# Patient Record
Sex: FEMALE | Race: WHITE | HISPANIC OR LATINO | Employment: FULL TIME | URBAN - METROPOLITAN AREA
[De-identification: names, ages, dates, MRNs, and addresses within clinical notes are randomized per-mention and may not be internally consistent; named-entity substitution may affect disease eponyms.]

---

## 2017-03-10 ENCOUNTER — HOSPITAL ENCOUNTER (OUTPATIENT)
Dept: RADIOLOGY | Facility: HOSPITAL | Age: 62
Discharge: HOME | End: 2017-03-10
Payer: COMMERCIAL

## 2017-03-10 ENCOUNTER — TRANSCRIBE ORDERS (OUTPATIENT)
Dept: ADMINISTRATIVE | Facility: HOSPITAL | Age: 62
End: 2017-03-10

## 2017-03-10 DIAGNOSIS — Z00.8 OTHER SPECIFIED GENERAL MEDICAL EXAMINATION: Primary | ICD-10-CM

## 2017-03-10 DIAGNOSIS — Z00.8 OTHER SPECIFIED GENERAL MEDICAL EXAMINATION: ICD-10-CM

## 2017-03-10 PROCEDURE — 71020 HB CHEST X-RAY 2VW FRONTAL&LATL: CPT

## 2019-04-10 ENCOUNTER — TRANSCRIBE ORDERS (OUTPATIENT)
Dept: ADMINISTRATIVE | Facility: HOSPITAL | Age: 64
End: 2019-04-10

## 2019-04-10 ENCOUNTER — HOSPITAL ENCOUNTER (OUTPATIENT)
Dept: RADIOLOGY | Facility: HOSPITAL | Age: 64
Discharge: HOME | End: 2019-04-10
Payer: COMMERCIAL

## 2019-04-10 DIAGNOSIS — R06.00 DYSPNEA, UNSPECIFIED TYPE: Primary | ICD-10-CM

## 2019-04-10 DIAGNOSIS — R06.00 DYSPNEA, UNSPECIFIED TYPE: ICD-10-CM

## 2019-04-10 PROCEDURE — 71046 X-RAY EXAM CHEST 2 VIEWS: CPT

## 2021-01-06 ENCOUNTER — TRANSCRIBE ORDERS (OUTPATIENT)
Dept: ADMINISTRATIVE | Facility: HOSPITAL | Age: 66
End: 2021-01-06

## 2021-01-06 ENCOUNTER — HOSPITAL ENCOUNTER (OUTPATIENT)
Dept: RADIOLOGY | Facility: HOSPITAL | Age: 66
Discharge: HOME | End: 2021-01-06
Payer: MEDICARE

## 2021-01-06 DIAGNOSIS — Z78.0 POST-MENOPAUSAL: ICD-10-CM

## 2021-01-06 DIAGNOSIS — Z00.00 ROUTINE GENERAL MEDICAL EXAMINATION AT A HEALTH CARE FACILITY: Primary | ICD-10-CM

## 2021-01-06 DIAGNOSIS — Z12.31 ENCOUNTER FOR SCREENING MAMMOGRAM FOR MALIGNANT NEOPLASM OF BREAST: Primary | ICD-10-CM

## 2021-01-06 PROCEDURE — 71046 X-RAY EXAM CHEST 2 VIEWS: CPT

## 2021-01-13 ENCOUNTER — OFFICE VISIT (OUTPATIENT)
Dept: GASTROENTEROLOGY | Facility: CLINIC | Age: 66
End: 2021-01-13
Payer: MEDICARE

## 2021-01-13 VITALS
TEMPERATURE: 97.8 F | HEART RATE: 78 BPM | DIASTOLIC BLOOD PRESSURE: 74 MMHG | WEIGHT: 112 LBS | BODY MASS INDEX: 23.41 KG/M2 | SYSTOLIC BLOOD PRESSURE: 154 MMHG

## 2021-01-13 DIAGNOSIS — R19.7 DIARRHEA, UNSPECIFIED TYPE: ICD-10-CM

## 2021-01-13 DIAGNOSIS — K90.0 CELIAC DISEASE: Primary | ICD-10-CM

## 2021-01-13 DIAGNOSIS — K21.9 GASTROESOPHAGEAL REFLUX DISEASE WITHOUT ESOPHAGITIS: ICD-10-CM

## 2021-01-13 PROCEDURE — 99204 OFFICE O/P NEW MOD 45 MIN: CPT | Performed by: INTERNAL MEDICINE

## 2021-01-13 RX ORDER — OMEPRAZOLE 40 MG/1
40 CAPSULE, DELAYED RELEASE ORAL DAILY
COMMUNITY
Start: 2021-01-07

## 2021-01-13 RX ORDER — UBIDECARENONE 75 MG
CAPSULE ORAL
COMMUNITY

## 2021-01-13 NOTE — PROGRESS NOTES
Consultation - [address] [facility]  Sharath Monae 1955 female         Chief Complaint:  For EGD and colonoscopy    HPI:  17-year-old  female with history of pancytopenia, celiac disease was brought in by her daughter who helped with translation  Patient was diagnosed with celiac disease about 5 years ago and reports not being strict on gluten free diet  She eats bread regularly  Complaining about chronic diarrhea but has improved from before with regular bowel movements even though she reports using half the Imodium tablet once a week  Denies any blood or mucus in the stool  Good appetite and denies any recent weight loss  Denies any heartburn or acid reflux but takes omeprazole regularly  Denies any difficulty swallowing  When I reviewed her previous records I found out that she was also significantly pancytopenic with platelet count of 16 K, WBC 2 7 back in 2016 when she was seen by Dr Lashonda Peng  She reports that she has not had any more follow-ups since then  She was just seen by her PCP in [facility] after more than year and had lab workup couple of weeks ago  Patient's daughter does not remember about the pancytopenia  REVIEW OF SYSTEMS: Review of Systems   Constitutional: Negative for activity change, appetite change, chills, diaphoresis, fatigue, fever and unexpected weight change  HENT: Negative for ear discharge, ear pain, facial swelling, hearing loss, nosebleeds, sore throat, tinnitus and voice change  Eyes: Negative for pain, discharge, redness, itching and visual disturbance  Respiratory: Negative for apnea, cough, chest tightness, shortness of breath and wheezing  Cardiovascular: Negative for chest pain and palpitations  Gastrointestinal:        As noted in HPI   Endocrine: Negative for cold intolerance, heat intolerance and polyuria  Genitourinary: Negative for difficulty urinating, dysuria, flank pain, hematuria and urgency  Musculoskeletal: Negative for arthralgias, back pain, gait problem, joint swelling and myalgias  Skin: Negative for rash and wound  Neurological: Negative for dizziness, tremors, seizures, speech difficulty, light-headedness, numbness and headaches  Hematological: Negative for adenopathy  Does not bruise/bleed easily  Psychiatric/Behavioral: Negative for agitation, behavioral problems and confusion  The patient is not nervous/anxious  Past Medical History:   Diagnosis Date    Anemia     Celiac disease     Diarrhea       Past Surgical History:   Procedure Laterality Date    EGD AND COLONOSCOPY Left 1/13/2016    Procedure: EGD with the biopsy and COLONOSCOPYwith biopsies;  Surgeon: Severo House, MD;  Location: [facility];   Service:     HYSTERECTOMY      oopherectomy     Social History     Socioeconomic History    Marital status: Single     Spouse name: Not on file    Number of children: Not on file    Years of education: Not on file    Highest education level: Not on file   Occupational History    Not on file   Social Needs    Financial resource strain: Not on file    Food insecurity     Worry: Not on file     Inability: Not on file    Transportation needs     Medical: Not on file     Non-medical: Not on file   Tobacco Use    Smoking status: Never Smoker    Smokeless tobacco: Never Used   Substance and Sexual Activity    Alcohol use: No    Drug use: No    Sexual activity: Not on file   Lifestyle    Physical activity     Days per week: Not on file     Minutes per session: Not on file    Stress: Not on file   Relationships    Social connections     Talks on phone: Not on file     Gets together: Not on file     Attends Baptism service: Not on file     Active member of club or organization: Not on file     Attends meetings of clubs or organizations: Not on file     Relationship status: Not on file    Intimate partner violence     Fear of current or ex partner: Not on file Emotionally abused: Not on file     Physically abused: Not on file     Forced sexual activity: Not on file   Other Topics Concern    Not on file   Social History Narrative    Not on file     Family History   Problem Relation Age of Onset    Hypertension Mother     Hyperlipidemia Mother     Lung cancer Father      Patient has no known allergies  Current Outpatient Medications   Medication Sig Dispense Refill    cyanocobalamin (VITAMIN B-12) 100 mcg tablet Take by mouth      omeprazole (PriLOSEC) 40 MG capsule Take 40 mg by mouth daily       No current facility-administered medications for this visit  Blood pressure 154/74, pulse 78, temperature 97 8 °F (36 6 °C), weight 50 8 kg (112 lb)  PHYSICAL EXAM: Physical Exam  Constitutional:       Appearance: She is well-developed  HENT:      Head: Normocephalic and atraumatic  Nose: Nose normal    Eyes:      General: No scleral icterus  Right eye: No discharge  Left eye: No discharge  Conjunctiva/sclera: Conjunctivae normal    Neck:      Musculoskeletal: Neck supple  Thyroid: No thyromegaly  Vascular: No JVD  Trachea: No tracheal deviation  Cardiovascular:      Rate and Rhythm: Normal rate and regular rhythm  Heart sounds: Normal heart sounds  No murmur  No friction rub  No gallop  Pulmonary:      Effort: Pulmonary effort is normal  No respiratory distress  Breath sounds: Normal breath sounds  No wheezing or rales  Chest:      Chest wall: No tenderness  Abdominal:      General: Bowel sounds are normal  There is no distension  Palpations: Abdomen is soft  There is no mass  Tenderness: There is no abdominal tenderness  There is no guarding or rebound  Hernia: No hernia is present  Musculoskeletal:         General: No tenderness or deformity  Lymphadenopathy:      Cervical: No cervical adenopathy  Skin:     General: Skin is warm and dry  Findings: No erythema or rash  Neurological:      Mental Status: She is alert and oriented to person, place, and time  Psychiatric:         Behavior: Behavior normal          Thought Content: Thought content normal           Lab Results   Component Value Date    WBC 4 5 03/10/2016    HGB 11 6 03/10/2016    HCT 36 9 03/10/2016    MCV 97 03/10/2016     03/10/2016     Lab Results   Component Value Date    GLUCOSE 88 01/08/2016    CALCIUM 8 6 01/13/2016     01/08/2016    K 3 7 01/13/2016    CO2 28 01/13/2016     01/13/2016    BUN 5 01/13/2016    CREATININE 0 47 (L) 01/13/2016     Lab Results   Component Value Date    ALT 42 01/08/2016    AST 66 (H) 01/08/2016    ALKPHOS 69 01/08/2016    BILITOT 0 8 01/08/2016     Lab Results   Component Value Date    INR 1 30 01/08/2016    PROTIME 13 5 (H) 01/08/2016       Xr Chest Pa & Lateral    Result Date: 1/8/2021  Impression: No acute cardiopulmonary disease  Workstation performed: [id number]       ASSESSMENT & PLAN:    Celiac disease  It appears the patient is not strict with gluten free diet    -explained to patient and her daughter in detail about the pathophysiology of celiac disease and advised about strict gluten free diet    -schedule for upper endoscopy with duodenal biopsies but concerned about the history of severe pancytopenia for which she has not had any followups with hematologist in the past few years  She just had lab workup through her PCP  Will get those lab results 1st and decide on the timing of the EGD and colonoscopy  Gastroesophageal reflux disease without esophagitis  Gastroesophageal reflux disease - Patient has the symptoms of chronic acid reflux for a long time  Possible hiatal hernia or LES weakness  Should rule out Coles's esophagus because of chronic symptoms         -Patient was explained about the lifestyle and dietary modifications  Advised to avoid fatty foods, chocolates, caffeine, alcohol and any other triggering foods    Avoid eating for at least 3 hours before going to bed  -EGD    -will try to change omeprazole to Pepcid after the EGD    Diarrhea  History of diarrhea which appear to be due to celiac disease    Patient reports using half a tablet of Imodium every week and having regular bowel movements     -schedule for colonoscopy along with EGD once get her lab results and get clearance from Hematology

## 2021-01-13 NOTE — ASSESSMENT & PLAN NOTE
Gastroesophageal reflux disease - Patient has the symptoms of chronic acid reflux for a long time  Possible hiatal hernia or LES weakness  Should rule out Coles's esophagus because of chronic symptoms         -Patient was explained about the lifestyle and dietary modifications  Advised to avoid fatty foods, chocolates, caffeine, alcohol and any other triggering foods  Avoid eating for at least 3 hours before going to bed          -EGD    -will try to change omeprazole to Pepcid after the EGD

## 2021-01-13 NOTE — ASSESSMENT & PLAN NOTE
It appears the patient is not strict with gluten free diet    -explained to patient and her daughter in detail about the pathophysiology of celiac disease and advised about strict gluten free diet    -schedule for upper endoscopy with duodenal biopsies but concerned about the history of severe pancytopenia for which she has not had any followups with hematologist in the past few years  She just had lab workup through her PCP  Will get those lab results 1st and decide on the timing of the EGD and colonoscopy

## 2021-01-13 NOTE — ASSESSMENT & PLAN NOTE
History of diarrhea which appear to be due to celiac disease    Patient reports using half a tablet of Imodium every week and having regular bowel movements     -schedule for colonoscopy along with EGD once get her lab results and get clearance from Hematology

## 2021-01-14 ENCOUNTER — CONSULT (OUTPATIENT)
Dept: CARDIOLOGY CLINIC | Facility: CLINIC | Age: 66
End: 2021-01-14
Payer: MEDICARE

## 2021-01-14 VITALS
OXYGEN SATURATION: 99 % | HEART RATE: 80 BPM | TEMPERATURE: 98 F | SYSTOLIC BLOOD PRESSURE: 130 MMHG | HEIGHT: 60 IN | WEIGHT: 111 LBS | BODY MASS INDEX: 21.79 KG/M2 | DIASTOLIC BLOOD PRESSURE: 86 MMHG

## 2021-01-14 DIAGNOSIS — I10 HYPERTENSION, UNSPECIFIED TYPE: Primary | ICD-10-CM

## 2021-01-14 DIAGNOSIS — R94.31 ABNORMAL EKG: ICD-10-CM

## 2021-01-14 PROCEDURE — 99204 OFFICE O/P NEW MOD 45 MIN: CPT | Performed by: INTERNAL MEDICINE

## 2021-01-14 PROCEDURE — 93000 ELECTROCARDIOGRAM COMPLETE: CPT | Performed by: INTERNAL MEDICINE

## 2021-02-11 ENCOUNTER — HOSPITAL ENCOUNTER (OUTPATIENT)
Dept: RADIOLOGY | Facility: HOSPITAL | Age: 66
Discharge: HOME | End: 2021-02-11
Payer: MEDICARE

## 2021-02-11 VITALS — BODY MASS INDEX: 20.42 KG/M2 | HEIGHT: 60 IN | WEIGHT: 104 LBS

## 2021-02-11 DIAGNOSIS — Z78.0 POST-MENOPAUSAL: ICD-10-CM

## 2021-02-11 DIAGNOSIS — Z12.31 ENCOUNTER FOR SCREENING MAMMOGRAM FOR MALIGNANT NEOPLASM OF BREAST: ICD-10-CM

## 2021-02-11 PROCEDURE — 77063 BREAST TOMOSYNTHESIS BI: CPT

## 2021-02-11 PROCEDURE — 77080 DXA BONE DENSITY AXIAL: CPT

## 2021-02-11 PROCEDURE — 77067 SCR MAMMO BI INCL CAD: CPT

## 2021-02-19 ENCOUNTER — TELEPHONE (OUTPATIENT)
Dept: RADIOLOGY | Facility: HOSPITAL | Age: 66
End: 2021-02-19

## 2021-02-19 ENCOUNTER — HOSPITAL ENCOUNTER (OUTPATIENT)
Dept: NON INVASIVE DIAGNOSTICS | Facility: HOSPITAL | Age: 66
Discharge: HOME | End: 2021-02-19
Attending: INTERNAL MEDICINE

## 2021-03-03 ENCOUNTER — HOSPITAL ENCOUNTER (OUTPATIENT)
Dept: RADIOLOGY | Facility: HOSPITAL | Age: 66
Discharge: HOME | End: 2021-03-03
Payer: MEDICARE

## 2021-03-03 VITALS — WEIGHT: 104 LBS | BODY MASS INDEX: 20.42 KG/M2 | HEIGHT: 60 IN

## 2021-03-03 DIAGNOSIS — R92.8 ABNORMAL SCREENING MAMMOGRAM: ICD-10-CM

## 2021-03-03 PROCEDURE — 76642 ULTRASOUND BREAST LIMITED: CPT

## 2021-03-03 PROCEDURE — 77065 DX MAMMO INCL CAD UNI: CPT

## 2021-03-03 PROCEDURE — G0279 TOMOSYNTHESIS, MAMMO: HCPCS

## 2021-03-04 ENCOUNTER — IMMUNIZATIONS (OUTPATIENT)
Dept: FAMILY MEDICINE CLINIC | Facility: HOSPITAL | Age: 66
End: 2021-03-04

## 2021-03-04 DIAGNOSIS — Z23 ENCOUNTER FOR IMMUNIZATION: Primary | ICD-10-CM

## 2021-03-04 PROCEDURE — 0001A SARS-COV-2 / COVID-19 MRNA VACCINE (PFIZER-BIONTECH) 30 MCG: CPT

## 2021-03-04 PROCEDURE — 91300 SARS-COV-2 / COVID-19 MRNA VACCINE (PFIZER-BIONTECH) 30 MCG: CPT

## 2021-03-25 ENCOUNTER — HOSPITAL ENCOUNTER (OUTPATIENT)
Dept: NON INVASIVE DIAGNOSTICS | Facility: HOSPITAL | Age: 66
Discharge: HOME | End: 2021-03-25
Attending: INTERNAL MEDICINE

## 2021-03-28 ENCOUNTER — IMMUNIZATIONS (OUTPATIENT)
Dept: FAMILY MEDICINE CLINIC | Facility: HOSPITAL | Age: 66
End: 2021-03-28

## 2021-03-28 DIAGNOSIS — Z23 ENCOUNTER FOR IMMUNIZATION: Primary | ICD-10-CM

## 2021-03-28 PROCEDURE — 91300 SARS-COV-2 / COVID-19 MRNA VACCINE (PFIZER-BIONTECH) 30 MCG: CPT

## 2021-03-28 PROCEDURE — 0002A SARS-COV-2 / COVID-19 MRNA VACCINE (PFIZER-BIONTECH) 30 MCG: CPT

## 2021-11-03 ENCOUNTER — TELEPHONE (OUTPATIENT)
Dept: DERMATOLOGY | Facility: CLINIC | Age: 66
End: 2021-11-03

## 2021-11-03 ENCOUNTER — TELEPHONE (OUTPATIENT)
Dept: PODIATRY | Facility: CLINIC | Age: 66
End: 2021-11-03

## 2021-11-11 ENCOUNTER — OFFICE VISIT (OUTPATIENT)
Dept: PODIATRY | Facility: CLINIC | Age: 66
End: 2021-11-11
Payer: MEDICARE

## 2021-11-11 VITALS
BODY MASS INDEX: 20.42 KG/M2 | DIASTOLIC BLOOD PRESSURE: 86 MMHG | HEART RATE: 78 BPM | WEIGHT: 104 LBS | SYSTOLIC BLOOD PRESSURE: 130 MMHG | RESPIRATION RATE: 17 BRPM | HEIGHT: 60 IN

## 2021-11-11 DIAGNOSIS — L85.3 XEROSIS CUTIS: Primary | ICD-10-CM

## 2021-11-11 DIAGNOSIS — M20.41 HAMMER TOES OF BOTH FEET: ICD-10-CM

## 2021-11-11 DIAGNOSIS — M79.672 PAIN IN BOTH FEET: ICD-10-CM

## 2021-11-11 DIAGNOSIS — L81.9 HYPERPIGMENTATION OF SKIN: ICD-10-CM

## 2021-11-11 DIAGNOSIS — M20.42 HAMMER TOES OF BOTH FEET: ICD-10-CM

## 2021-11-11 DIAGNOSIS — I83.819 VARICOSE VEINS WITH PAIN: ICD-10-CM

## 2021-11-11 DIAGNOSIS — M79.671 PAIN IN BOTH FEET: ICD-10-CM

## 2021-11-11 DIAGNOSIS — L60.0 INGROWN TOENAIL: ICD-10-CM

## 2021-11-11 DIAGNOSIS — B35.1 ONYCHOMYCOSIS: ICD-10-CM

## 2021-11-11 PROCEDURE — 99203 OFFICE O/P NEW LOW 30 MIN: CPT | Performed by: PODIATRIST

## 2021-11-16 RX ORDER — AMMONIUM LACTATE 12 G/100G
LOTION TOPICAL 2 TIMES DAILY PRN
Qty: 400 G | Refills: 0 | Status: SHIPPED | OUTPATIENT
Start: 2021-11-16

## 2022-01-05 ENCOUNTER — OFFICE VISIT (OUTPATIENT)
Dept: DERMATOLOGY | Age: 67
End: 2022-01-05
Payer: MEDICARE

## 2022-01-05 VITALS — BODY MASS INDEX: 22.87 KG/M2 | TEMPERATURE: 97.3 F | WEIGHT: 106 LBS | HEIGHT: 57 IN

## 2022-01-05 DIAGNOSIS — L85.3 XEROSIS OF SKIN: Primary | ICD-10-CM

## 2022-01-05 PROCEDURE — 99203 OFFICE O/P NEW LOW 30 MIN: CPT | Performed by: DERMATOLOGY

## 2022-01-05 RX ORDER — UREA 10 %
LOTION (ML) TOPICAL 2 TIMES DAILY
Qty: 453 G | Refills: 1 | Status: SHIPPED | OUTPATIENT
Start: 2022-01-05 | End: 2022-02-04

## 2022-01-05 NOTE — PATIENT INSTRUCTIONS
XEROSIS ("DRY SKIN")    Assessment and Plan:  Based on a thorough discussion of this condition and the management approach to it (including a comprehensive discussion of the known risks, side effects and potential benefits of treatment), the patient (family) agrees to implement the following specific plan:   Urea cream twice a day to arms and once a day to face especially after bathing until improved   Limit soap to arms to 1-2 times a week    Follow up as needed     Dry skin refers to skin that feels dry to touch  Dry skin has a dull surface with a rough, scaly quality  The skin is less pliable and cracked  When dryness is severe, the skin may become inflamed and fissured  Although any body site can be dry, dry skin tends to affect the shins more than any other site  Dry skin is lacking moisture in the outer horny cell layer (stratum corneum) and this results in cracks in the skin surface  Dry skin is also called xerosis, xeroderma or asteatosis (lack of fat)  It can affect males and females of all ages  There is some racial variability in water and lipid content of the skin   Dry skin that starts in early childhood may be one of about 20 types of ichthyosis (fish-scale skin)  There is often a family history of dry skin   Dry skin is commonly seen in people with atopic dermatitis   Nearly everyone > 60 years has dry skin  Dry skin that begins later may be seen in people with certain diseases and conditions   Postmenopausal women   Hypothyroidism   Chronic renal disease    Malnutrition and weight loss    Subclinical dermatitis    Treatment with certain drugs such as oral retinoids, diuretics and epidermal growth factor receptor inhibitors    People exposed to a dry environment may experience dry skin     Low humidity: in desert climates or cool, windy conditions    Excessive air conditioning    Direct heat from a fire or fan heater    Excessive bathing    Contact with soap, detergents and solvents    Inappropriate topical agents such as alcohol    Frictional irritation from rough clothing or abrasives    Dry skin is due to abnormalities in the integrity of the barrier function of the stratum corneum, which is made up of corneocytes   There is an overall reduction in the lipids in the stratum corneum   Ratio of ceramides, cholesterol and free fatty acids may be normal or altered   There may be a reduction in the proliferation of keratinocytes   Keratinocyte subtypes change in dry skin with a decrease in keratins K1, K10 and increase in K5, K14     Involucrin (a protein) may be expressed early, increasing cell stiffness   The result is retention of corneocytes and reduced water-holding capacity  The inherited forms of ichthyosis are due to loss of function mutations in various genes (listed in parentheses below)  The clinical features of ichthyosis depend on the specific type of ichthyosis:   Ichthyosis vulgaris (FLG)   Recessive X-linked ichthyosis (STS)    Autosomal recessive congenital ichthyosis (ABCA12, TGM1, ALOXE3)    Keratinopathic ichthyoses (KRT1, KRT10, KRT2)    Acquired ichthyosis may be due to:   Metabolic factors: thyroid deficiency    Illness: lymphoma, internal malignancy, sarcoidosis, HIV infection    Drugs: nicotinic acid, kava, protein kinase inhibitors (eg EGFR inhibitors), hydroxyurea  Complications of dry skin:  Dry areas of skin may become itchy, indicating a form of eczema/dermatitis has developed   Atopic eczema -- especially in people with ichthyosis vulgaris    Eczema craquelé -- especially in elderly people  Also called asteatotic eczema    A dry form of nummular dermatitis/discoid eczema -- especially in people that wash their skin excessively  When the dry skin of an elderly person is itchy without a visible rash, it is sometimes called winter itch, 7th age itch, senile pruritus or chronic pruritus of the elderly      Other complications of dry skin may include:   Skin infection when bacteria or viruses penetrate a break in the skin surface    Overheating, especially in some forms of ichthyosis    Food allergy, eg, to peanuts, has been associated with filaggrin mutations    Contact allergy, eg, to nickel, has also been correlated with barrier function defects  How is the type of dry skin diagnosed? The type of dry skin is diagnosed by careful history and examination  In children:   Family history    Age of onset    Appearance at birth, if known    Distribution of dry skin    Other features, eg eczema, abnormal nails, hair, dentition, sight, hearing  In adults:   Medical history    Medications and topical preparations    Bathing frequency and use of soap    Evaluation of environmental factors that may contribute to dry skin  What is the treatment for dry skin? The mainstay of treatment of dry skin and ichthyosis is moisturisers/emollients  They should be applied liberally and often enough to:   Reduce itch    Improve the barrier function    Prevent entry of irritants, bacteria    Reduce transepidermal water loss  When considering which emollient is most suitable, consider:   Severity of the dryness    Tolerance    Personal preference    Cost and availability  Emollients generally work best if applied to damp skin, if pH is below 7 (acidic), and if containing humectants such as urea or propylene glycol  Additional treatments include:   Topical steroid if itchy or there is dermatitis -- choose an emollient base    Topical calcineurin inhibitors if topical steroids are unsuitable  How can dry skin be prevented? Eliminate aggravating factors:   Reduce the frequency of bathing   A humidifier in winter and air conditioner in summer    Compare having a short shower with a prolonged soak in a bath   Use lukewarm, not hot, water      Replace standard soap with a substitute such as a synthetic detergent cleanser, water-miscible emollient, bath oil, anti-pruritic tar oil, colloidal oatmeal etc     Apply an emollient liberally and often, particularly shortly after bathing, and when itchy  The drier the skin, the thicker this should be, especially on the hands  What is the outlook for dry skin? A tendency to dry skin may persist life-long, or it may improve once contributing factors are controlled

## 2022-01-05 NOTE — PROGRESS NOTES
[facility] Note     Patient Name: Maldonado Almonte  Encounter Date: 01/05/2021     Have you been cared for by a [facility] in the last 3 years and, if so, which one? No    · Have you traveled outside of the [address] [place] [address] in the past 3 months or outside of the [facility] area in the last 2 weeks? No     May we call your Preferred Phone number to discuss your specific medical information? Yes     May we leave a detailed message that includes your specific medical information? Yes      Today's Chief Concerns:   Concern #1:  Dry skin and pigmentation       Past Medical History:  Have you personally ever had or currently have any of the following? · Skin cancer (such as Melanoma, Basal Cell Carcinoma, Squamous Cell Carcinoma? (If Yes, please provide more detail)- No  · Eczema: No  · Psoriasis: No  · HIV/AIDS: No  · Hepatitis B or C: No  · Tuberculosis: No  · Systemic Immunosuppression such as Diabetes, Biologic or Immunotherapy, Chemotherapy, Organ Transplantation, Bone Marrow Transplantation (If YES, please provide more detail): No  · Radiation Treatment (If YES, please provide more detail): No  · Any other major medical conditions/concerns? (If Yes, which types)- No    Social History:         Family History:  Have any of your "first degree relatives" (parent, brother, sister, or child) had any of the following       · Skin cancer such as Melanoma or Merkel Cell Carcinoma or Pancreatic Cancer? No  · Eczema, Asthma, Hay Fever or Seasonal Allergies: No  · Psoriasis or Psoriatic Arthritis: No  · Do any other medical conditions seem to run in your family? If Yes, what condition and which relatives?   No    Current Medications:       Current Outpatient Medications:     ammonium lactate (LAC-HYDRIN) 12 % lotion, Apply topically 2 (two) times a day as needed for dry skin, Disp: 400 g, Rfl: 0    cyanocobalamin (VITAMIN B-12) 100 mcg tablet, Take by mouth, Disp: , Rfl:     omeprazole (PriLOSEC) 40 MG capsule, Take 40 mg by mouth daily, Disp: , Rfl:       Review of Systems:  Have you recently had or currently have any of the following? If YES, what are you doing for the problem? · Fever, chills or unintended weight loss: No  · Sudden loss or change in your vision: No  · Nausea, vomiting or blood in your stool: No  · Painful or swollen joints: No  · Wheezing or cough: No  · Changing mole or non-healing wound: No  · Nosebleeds: No  · Excessive sweating: No  · Easy or prolonged bleeding? No  · Over the last 2 weeks, how often have you been bothered by the following problems? · Taking little interest or pleasure in doing things: 1 - Not at All  · Feeling down, depressed, or hopeless: 1 - Not at All  · Rapid heartbeat with epinephrine:  No    · FEMALES ONLY:    · Are you pregnant or planning to become pregnant? No  · Are you currently or planning to be nursing or breast feeding? No    · Any known allergies? · No Known Allergies      Physical Exam:     Was a chaperone (Derm Clinical Assistant) present throughout the entire Physical Exam? Yes     Did the Dermatology Team specifically  the patient on the importance of a Full Skin Exam to be sure that nothing is missed clinically? Yes}  o Did the patient ultimately request or accept a Full Skin Exam?  NO  o Did the patient specifically refuse to have the areas "under-the-bra" examined by the Dermatologist? No  o Did the patient specifically refuse to have the areas "under-the-underwear" examined by the Dermatologist? No    CONSTITUTIONAL:   There were no vitals filed for this visit      PSYCH: Normal mood and affect  EYES: Normal conjunctiva  ENT: Normal lips and oral mucosa  CARDIOVASCULAR: No edema  RESPIRATORY: Normal respirations  HEME/LYMPH/IMMUNO:  No regional lymphadenopathy except as noted below in "ASSESSMENT AND PLAN BY DIAGNOSIS"    SKIN:  FULL ORGAN SYSTEM EXAM  Hair, Scalp, Ears, Face Normal except as noted below in Assessment   Neck,  Normal except as noted below in Assessment   Right Arm/Hand/Fingers Normal except as noted below in Assessment   Left Arm/Hand/Fingers Normal except as noted below in Assessment                                Assessment and Plan by Diagnosis:    History of Present Condition:     Duration:  How long has this been an issue for you?    o  more than 6 months    Location Affected:  Where on the body is this affecting you?    o  left cheek, and left arm    Quality:  Is there any bleeding, pain, itch, burning/irritation, or redness associated with the skin lesion?    o  Dry and hyperpigmented    Severity:  Describe any bleeding, pain, itch, burning/irritation, or redness on a scale of 1 to 10 (with 10 being the worst)  o  2   Timing:  Does this condition seem to be there pretty constantly or do you notice it more at specific times throughout the day? o  constant    Context:  Have you ever noticed that this condition seems to be associated with specific activities you do?    o  denies    Modifying Factors:    o Anything that seems to make the condition worse?    -  denies   o What have you tried to do to make the condition better? -  Currently using lac hydrin moisturizer    Associated Signs and Symptoms:  Does this skin lesion seem to be associated with any of the following:  o  SL AMB DERM SIGNS AND SYMPTOMS: Itching and Scratching   XEROSIS ("DRY SKIN")    Physical Exam:   Anatomic Location Affected:  Bilateral arms    Morphological Description:  Dry scaling    Pertinent Positives:   Pertinent Negatives:     Additional History of Present Condition:  Patient is here with daughter who states     Assessment and Plan:  Based on a thorough discussion of this condition and the management approach to it (including a comprehensive discussion of the known risks, side effects and potential benefits of treatment), the patient (family) agrees to implement the following specific plan:   Urea cream twice a day to arms and once a day to face especially after bathing until improved   Limit soap to arms to 1-2 times a week    Follow up as needed     Dry skin refers to skin that feels dry to touch  Dry skin has a dull surface with a rough, scaly quality  The skin is less pliable and cracked  When dryness is severe, the skin may become inflamed and fissured  Although any body site can be dry, dry skin tends to affect the shins more than any other site  Dry skin is lacking moisture in the outer horny cell layer (stratum corneum) and this results in cracks in the skin surface  Dry skin is also called xerosis, xeroderma or asteatosis (lack of fat)  It can affect males and females of all ages  There is some racial variability in water and lipid content of the skin   Dry skin that starts in early childhood may be one of about 20 types of ichthyosis (fish-scale skin)  There is often a family history of dry skin   Dry skin is commonly seen in people with atopic dermatitis   Nearly everyone > 60 years has dry skin  Dry skin that begins later may be seen in people with certain diseases and conditions   Postmenopausal women   Hypothyroidism   Chronic renal disease    Malnutrition and weight loss    Subclinical dermatitis    Treatment with certain drugs such as oral retinoids, diuretics and epidermal growth factor receptor inhibitors    People exposed to a dry environment may experience dry skin   Low humidity: in desert climates or cool, windy conditions    Excessive air conditioning    Direct heat from a fire or fan heater    Excessive bathing    Contact with soap, detergents and solvents    Inappropriate topical agents such as alcohol    Frictional irritation from rough clothing or abrasives    Dry skin is due to abnormalities in the integrity of the barrier function of the stratum corneum, which is made up of corneocytes     There is an overall reduction in the lipids in the stratum corneum   Ratio of ceramides, cholesterol and free fatty acids may be normal or altered   There may be a reduction in the proliferation of keratinocytes   Keratinocyte subtypes change in dry skin with a decrease in keratins K1, K10 and increase in K5, K14     Involucrin (a protein) may be expressed early, increasing cell stiffness   The result is retention of corneocytes and reduced water-holding capacity  The inherited forms of ichthyosis are due to loss of function mutations in various genes (listed in parentheses below)  The clinical features of ichthyosis depend on the specific type of ichthyosis:   Ichthyosis vulgaris (FLG)   Recessive X-linked ichthyosis (STS)    Autosomal recessive congenital ichthyosis (ABCA12, TGM1, ALOXE3)    Keratinopathic ichthyoses (KRT1, KRT10, KRT2)    Acquired ichthyosis may be due to:   Metabolic factors: thyroid deficiency    Illness: lymphoma, internal malignancy, sarcoidosis, HIV infection    Drugs: nicotinic acid, kava, protein kinase inhibitors (eg EGFR inhibitors), hydroxyurea  Complications of dry skin:  Dry areas of skin may become itchy, indicating a form of eczema/dermatitis has developed   Atopic eczema -- especially in people with ichthyosis vulgaris    Eczema craquelé -- especially in elderly people  Also called asteatotic eczema    A dry form of nummular dermatitis/discoid eczema -- especially in people that wash their skin excessively  When the dry skin of an elderly person is itchy without a visible rash, it is sometimes called winter itch, 7th age itch, senile pruritus or chronic pruritus of the elderly      Other complications of dry skin may include:   Skin infection when bacteria or viruses penetrate a break in the skin surface    Overheating, especially in some forms of ichthyosis    Food allergy, eg, to peanuts, has been associated with filaggrin mutations    Contact allergy, eg, to nickel, has also been correlated with barrier function defects  How is the type of dry skin diagnosed? The type of dry skin is diagnosed by careful history and examination  In children:   Family history    Age of onset    Appearance at birth, if known    Distribution of dry skin    Other features, eg eczema, abnormal nails, hair, dentition, sight, hearing  In adults:   Medical history    Medications and topical preparations    Bathing frequency and use of soap    Evaluation of environmental factors that may contribute to dry skin  What is the treatment for dry skin? The mainstay of treatment of dry skin and ichthyosis is moisturisers/emollients  They should be applied liberally and often enough to:   Reduce itch    Improve the barrier function    Prevent entry of irritants, bacteria    Reduce transepidermal water loss  When considering which emollient is most suitable, consider:   Severity of the dryness    Tolerance    Personal preference    Cost and availability  Emollients generally work best if applied to damp skin, if pH is below 7 (acidic), and if containing humectants such as urea or propylene glycol  Additional treatments include:   Topical steroid if itchy or there is dermatitis -- choose an emollient base    Topical calcineurin inhibitors if topical steroids are unsuitable  How can dry skin be prevented? Eliminate aggravating factors:   Reduce the frequency of bathing   A humidifier in winter and air conditioner in summer    Compare having a short shower with a prolonged soak in a bath   Use lukewarm, not hot, water   Replace standard soap with a substitute such as a synthetic detergent cleanser, water-miscible emollient, bath oil, anti-pruritic tar oil, colloidal oatmeal etc     Apply an emollient liberally and often, particularly shortly after bathing, and when itchy  The drier the skin, the thicker this should be, especially on the hands      What is the outlook for dry skin? A tendency to dry skin may persist life-long, or it may improve once contributing factors are controlled        Scribe Attestation    I,:  Karlee Pinto am acting as a scribe while in the presence of the attending physician :       I,:  Yanna Fernandes MD personally performed the services described in this documentation    as scribed in my presence :

## 2022-01-14 ENCOUNTER — TELEPHONE (OUTPATIENT)
Dept: DERMATOLOGY | Facility: CLINIC | Age: 67
End: 2022-01-14

## 2022-02-01 ENCOUNTER — TELEPHONE (OUTPATIENT)
Dept: DERMATOLOGY | Facility: CLINIC | Age: 67
End: 2022-02-01

## 2022-02-01 DIAGNOSIS — L85.3 XEROSIS OF SKIN: Primary | ICD-10-CM

## 2022-02-01 NOTE — TELEPHONE ENCOUNTER
Urea 10% cream DENIED by Medicare part B  Patient can obtain this cream over the counter  I called the patient, no answer, left vm in Belgian with details   Reference ID#: [id number]

## 2022-02-01 NOTE — TELEPHONE ENCOUNTER
Dr Mali Varma: I called [facility] and spoke with Marika Carter who stated Urea 10% cream is DENIED under diagnosis code Xerosis of skin ICD-10:  L85 3  It was denied by Medicare Part B because this medication can be obtained over the counter  Reference ID: [id number]  I called the patient to inform her about this, but no answer, left a detail message in [address] [facility] (She may obtain the Urea 10% cream any over the counter pharmacy or [facility])  Advised her to call me at [phone] ( work number) or office at [phone]

## 2022-02-17 ENCOUNTER — OFFICE VISIT (OUTPATIENT)
Dept: PODIATRY | Facility: CLINIC | Age: 67
End: 2022-02-17
Payer: MEDICARE

## 2022-02-17 VITALS — RESPIRATION RATE: 17 BRPM | HEART RATE: 78 BPM | BODY MASS INDEX: 22.87 KG/M2 | HEIGHT: 57 IN | WEIGHT: 106 LBS

## 2022-02-17 DIAGNOSIS — L81.9 HYPERPIGMENTATION OF SKIN: ICD-10-CM

## 2022-02-17 DIAGNOSIS — L60.0 INGROWN TOENAIL: ICD-10-CM

## 2022-02-17 DIAGNOSIS — M20.41 HAMMER TOES OF BOTH FEET: ICD-10-CM

## 2022-02-17 DIAGNOSIS — M20.42 HAMMER TOES OF BOTH FEET: ICD-10-CM

## 2022-02-17 DIAGNOSIS — I83.819 VARICOSE VEINS WITH PAIN: ICD-10-CM

## 2022-02-17 DIAGNOSIS — L84 HELOMA MOLLE: ICD-10-CM

## 2022-02-17 DIAGNOSIS — M79.671 PAIN IN BOTH FEET: Primary | ICD-10-CM

## 2022-02-17 DIAGNOSIS — B35.1 ONYCHOMYCOSIS: ICD-10-CM

## 2022-02-17 DIAGNOSIS — M79.672 PAIN IN BOTH FEET: Primary | ICD-10-CM

## 2022-02-17 PROCEDURE — 99213 OFFICE O/P EST LOW 20 MIN: CPT | Performed by: PODIATRIST

## 2022-04-01 NOTE — PROGRESS NOTES
Assessment/Plan:    Patient opted out of oral  anti fungal medications at this time, all onychomycotic dystrophic nails debrided using sterile Nipper and electrical baldev to patient tolerance, Betadine applied, patient educated on daily foot hygiene for the management of fungal infection  One bottle of CNU dispensed to the patient, patient educated on its use  Bilateral ingrown toenail, aseptic resection of the offending nail border in a slant back fashion using sterile Nipper distal to the eponychium, the use of wide toe box shoes, patient to report to the office if condition recur  Patient advised and the following with vascular surgeon regarding the vein patient is using compression stockings as discussed with the patient bunion and hammertoe deformity management including conservative orthotics and shoe modification of oral or injection anti-inflammatory medication versus surgical management  Patient use ammonium lactate for xerosis and hyper pigmentation  Diagnoses and all orders for this visit:    Pain in both feet    Ingrown toenail    Onychomycosis    Hammer toes of both feet    Hyperpigmentation of skin    Varicose veins with pain          Subjective:      Patient ID: Nithya Wiggins is a 77 y o  female  55-year-old female with no pertinent Podiatry past medical history of the management of thickened elongated dystrophic discolored painful toenails patient states that the condition has been present for years, patient also report change in color her skin, patient denies constitutional symptoms, patient denies trauma to the      The following portions of the patient's history were reviewed and updated as appropriate: allergies, current medications, past family history, past medical history, past social history, past surgical history and problem list     Review of Systems   Constitutional: Negative  Respiratory: Negative  Cardiovascular: Negative  Musculoskeletal: Negative      Skin: Negative  Historical Information   Past Medical History:   Diagnosis Date    Anemia     Celiac disease     Diarrhea      Past Surgical History:   Procedure Laterality Date    EGD AND COLONOSCOPY Left 1/13/2016    Procedure: EGD with the biopsy and COLONOSCOPYwith biopsies;  Surgeon: Tavares Bhandari MD;  Location: [place] [address] [facility]; Service:     HYSTERECTOMY      oopherectomy     Social History   Social History     Substance and Sexual Activity   Alcohol Use No     Social History     Substance and Sexual Activity   Drug Use No     Social History     Tobacco Use   Smoking Status Never Smoker   Smokeless Tobacco Never Used     Family History:   Family History   Problem Relation Age of Onset    Hypertension Mother     Hyperlipidemia Mother     Lung cancer Father     Breast cancer Sister 47    Breast cancer Maternal Aunt     Stomach cancer Sister     No Known Problems Sister     No Known Problems Sister     No Known Problems Sister        Meds/Allergies   all medications and allergies reviewed  No Known Allergies    Objective:      Pulse 78   Resp 17   Ht 4' 9" (1 448 m)   Wt 48 1 kg (106 lb)   BMI 22 94 kg/m²          Physical Exam  Constitutional:       General: She is not in acute distress  Appearance: She is well-developed  She is not ill-appearing, toxic-appearing or diaphoretic  HENT:      Head: Normocephalic and atraumatic  Cardiovascular:      Pulses: Normal pulses  Dorsalis pedis pulses are 2+ on the right side and 2+ on the left side  Posterior tibial pulses are 2+ on the right side and 2+ on the left side  Comments: Palpable pedal pulse, CFT is less than 3 seconds, temperature gradient within normal limits, pedal hair present, no trophic skin changes  Varicosity noted bilateral foot and ankle  Painful on palpation  Pulmonary:      Effort: No respiratory distress  Musculoskeletal:         General: Normal range of motion        Comments: No pain on palpation on range of motion of ankle joint subtalar joint metatarsal joint tarsal joints bilateral foot    HAV deformity bilateral with hypermobile 1st ray, flexible hammertoe deformity bilateral foot reducible deformity increased upon loading the forefoot   Feet:      Right foot:      Protective Sensation: 10 sites tested  10 sites sensed  Skin integrity: No ulcer, blister, skin breakdown or erythema  Left foot:      Protective Sensation: 10 sites tested  10 sites sensed  Skin integrity: No ulcer, blister, skin breakdown or erythema  Skin:     Capillary Refill: Capillary refill takes 2 to 3 seconds  Coloration: Skin is not pale  Comments: Onychomycotic the midfoot, thickened dystrophic discolored with subungual debris malodor noted painful on firm palpation, onychocryptosis noted to bilateral hallucal lateral nail border,    Ashy skin eruption bilateral foot with areas pigmentation and hyper pigmentation skin    Heloma noted 4th interspace on the right, painful on palpation   Neurological:      Mental Status: She is alert and oriented to person, place, and time  Comments:  muscle power 5/5, protective sensations intact, no focal motor deficit        Foot Exam    Right Foot/Ankle     Inspection and Palpation  Skin Exam: no blister, no maceration, no ulcer and no erythema     Neurovascular  Dorsalis pedis: 2+  Posterior tibial: 2+      Left Foot/Ankle      Inspection and Palpation  Skin Exam: no blister, no maceration, no ulcer and no erythema     Neurovascular  Dorsalis pedis: 2+  Posterior tibial: 2+

## 2023-07-13 ENCOUNTER — OFFICE VISIT (OUTPATIENT)
Dept: GASTROENTEROLOGY | Facility: CLINIC | Age: 68
End: 2023-07-13
Payer: MEDICARE

## 2023-07-13 VITALS
HEART RATE: 90 BPM | HEIGHT: 59 IN | OXYGEN SATURATION: 98 % | WEIGHT: 107 LBS | SYSTOLIC BLOOD PRESSURE: 136 MMHG | DIASTOLIC BLOOD PRESSURE: 92 MMHG | BODY MASS INDEX: 21.57 KG/M2

## 2023-07-13 DIAGNOSIS — K90.0 CELIAC DISEASE: ICD-10-CM

## 2023-07-13 DIAGNOSIS — K31.A0 INTESTINAL METAPLASIA OF STOMACH: Primary | ICD-10-CM

## 2023-07-13 DIAGNOSIS — Z80.0 FAMILY HISTORY OF COLON CANCER: ICD-10-CM

## 2023-07-13 DIAGNOSIS — D69.6 THROMBOCYTOPENIA (HCC): ICD-10-CM

## 2023-07-13 PROCEDURE — 99204 OFFICE O/P NEW MOD 45 MIN: CPT | Performed by: PHYSICIAN ASSISTANT

## 2023-07-13 RX ORDER — BIMATOPROST 0.1 MG/ML
SOLUTION/ DROPS OPHTHALMIC
COMMUNITY
Start: 2023-06-13

## 2023-07-13 RX ORDER — LOSARTAN POTASSIUM AND HYDROCHLOROTHIAZIDE 12.5; 1 MG/1; MG/1
1 TABLET ORAL DAILY
COMMUNITY
Start: 2023-04-21

## 2023-07-13 RX ORDER — OMEPRAZOLE 40 MG/1
40 CAPSULE, DELAYED RELEASE ORAL
Qty: 30 CAPSULE | Refills: 6 | Status: SHIPPED | OUTPATIENT
Start: 2023-07-13

## 2023-07-13 NOTE — PATIENT INSTRUCTIONS
Scheduled date of EGD/colonoscopy (as of today):08/01/23  Physician performing EGD/colonoscopy:Vega  Location of EGD/colonoscopy:[facility]  Desired bowel prep reviewed with patient:Akiko  Instructions reviewed with patient by:Eveline DUGGAN  Clearances:   None

## 2023-07-13 NOTE — PROGRESS NOTES
[facility] - Outpatient Follow-up Note  aJke Rossi 79 y.o. female MRN: [id number]  Encounter: [id number]          ASSESSMENT AND PLAN:      1. Celiac disease  Patient with diagnosis of celiac disease in 2016 after admission for significant pancytopenia. No follow-up since 2021. She reports following a gluten-free diet currently. Reports bowel movements are mainly regular.    -We will pursue EGD for celiac as well as gastric intestinal metaplasia as noted below.  -Recommend obtaining basic lab work including CBC, CMP as well celiac panel. We will check iron panel and vitamin B12 as well. - Consider referral to hematology if any significant abnormalities.  -Discussed the importance of continuing gluten-free diet. Consider referral to nutrition in the future if celiac appears uncontrolled as she reports adherence to diet. 2. Gastric intestinal metaplasia  Noted on EGD in 2016. Last office visit in 2021 however did not follow-up with EGD at that time. Reports sister with gastric cancer recently diagnosed.    -Schedule EGD.  -Discussed the risk of seizure including bleeding, infection and perforation.    - Restart omeprazole 40 mg daily. Discussed the importance of continuing this in the setting of metaplasia. - Continue to avoid smoking, alcohol and NSAIDs. 3.  Epigastric pain  Reports new symptoms after taking new blood pressure pill over the last few months. If she eats before taking this she has no symptoms. Abdominal exam is benign.    -Continue to eat food with medications. - They will be following up with cardiology in a few weeks. - If symptoms are persistent and EGD is unremarkable, recommend abdominal imaging    4. Family history of colon cancer, sister  Reports her sister was recently diagnosed with colon cancer at the age of 76. She had colonoscopy in 2016 which was normal.  Unclear of any history of colon polyps.   Bowel movements relatively regular at this time. No recent lab work.    -Schedule colonoscopy at same time as EGD  -Discussed the risks of procedure including bleeding, infection and perforation.  - GoLytely prep ordered    -Patient will be obtaining basic lab work including CBC and CMP prior to procedures. 5.  Pancytopenia  As well as nation for this in 2016. Followed up with hematology thought to be secondary to infectious versus celiac disease. She had repeat lab work and all cell counts have since resolved however no recent lab work available to me since 2016.    -Repeat CBC and CMP now to ensure continue to be resolved. - Would recommend referral to hematology if any persistent abnormalities. Follow up after procedure  ______________________________________________________________________    SUBJECTIVE:      Britta Aaron is a 49-year-old female with history of celiac disease, GERD who presents the office for follow-up. Presents with her daughter who helps with translation. Patient was last in the office in 2021. At that time she was recommended for EGD and colonoscopy as well as lab work however did not have this performed. Patient is doing relatively well. However she reports being nervous because her sister was recently diagnosed with gastric cancer at the age of 79 as well as her other sister being diagnosed with colon cancer at the age of 76 prompting her to come in today. She reports relatively doing well. She is currently following a gluten-free diet. She does report if she does eat gluten she has diarrhea. She also reports some epigastric pain however this is only when taking her blood pressure pill which is new over the past few months. If she eats before taking this she has been doing well. She reports recently stopping her omeprazole. Denies any smoking, alcohol or NSAID. Last lab work available in the chart to me was from 2016 showing normal white count, hemoglobin and platelets.   However does appear she had significant pancytopenia in the past with platelets as low as 16 around that time. She had EGD and colonoscopy 1/2016. She was found to have atrophic gastritis, hemorrhoids, normal duodenum. Biopsies at that time were then notable for celiac disease. Gastric biopsies negative for H. pylori however was found to have intestinal metaplasia. REVIEW OF SYSTEMS IS OTHERWISE NEGATIVE. Historical Information   Past Medical History:   Diagnosis Date   • Anemia    • Celiac disease    • Diabetes mellitus ([address])     pre diabetes   • Diarrhea    • Hypertension      Past Surgical History:   Procedure Laterality Date   • EGD AND COLONOSCOPY Left 1/13/2016    Procedure: EGD with the biopsy and COLONOSCOPYwith biopsies;  Surgeon: Michael Cade MD;  Location: [address] [place] [address] [facility]; Service:    • HYSTERECTOMY      oopherectomy     Social History   Social History     Substance and Sexual Activity   Alcohol Use No     Social History     Substance and Sexual Activity   Drug Use No     Social History     Tobacco Use   Smoking Status Never   Smokeless Tobacco Never     Family History   Problem Relation Age of Onset   • Hypertension Mother    • Hyperlipidemia Mother    • Lung cancer Father    • Breast cancer Sister 47   • Colon cancer Sister    • Stomach cancer Sister    • No Known Problems Sister    • No Known Problems Sister    • No Known Problems Sister    • Breast cancer Maternal Aunt        Meds/Allergies       Current Outpatient Medications:   •  cyanocobalamin (VITAMIN B-12) 100 mcg tablet  •  losartan-hydrochlorothiazide (HYZAAR) 100-12.5 MG per tablet  •  Lumigan 0.01 % ophthalmic drops  •  omeprazole (PriLOSEC) 40 MG capsule  •  polyethylene glycol (GOLYTELY) 4000 mL solution  •  ammonium lactate (LAC-HYDRIN) 12 % lotion  •  urea (Urea 10 Hydrating) 10 % cream    No Known Allergies        Objective     Blood pressure 136/92, pulse 90, height 4' 11" (1.499 m), weight 48.5 kg (107 lb), SpO2 98 %.  Body mass index is 21.61 kg/m².      PHYSICAL EXAM:      General Appearance:   Alert, cooperative, no distress   HEENT:   Normocephalic, atraumatic, anicteric.     Neck:  Supple, symmetrical, trachea midline   Lungs:   Clear to auscultation bilaterally; no rales, rhonchi or wheezing; respirations unlabored    Heart[de-identified]   Regular rate and rhythm; no murmur, rub, or gallop. Abdomen:   Soft, non-tender, non-distended; normal bowel sounds; no masses, no organomegaly    Genitalia:   Deferred    Rectal:   Deferred    Extremities:  No cyanosis, clubbing or edema    Pulses:  2+ and symmetric    Skin:  No jaundice, rashes, or lesions    Lymph nodes:  No palpable cervical lymphadenopathy        Lab Results:   No visits with results within 1 Day(s) from this visit. Latest known visit with results is:   Office Visit on 02/10/2016   Component Date Value   • WBC 03/10/2016 4.5    • RBC 03/10/2016 3.82    • Hemoglobin 03/10/2016 11.6    • Hematocrit 03/10/2016 36.9    • MCV 03/10/2016 97    • MCH 03/10/2016 30.4    • MCHC 03/10/2016 31.4 (L)    • RDW 03/10/2016 14.2    • Platelets 71/84/6429 324    • Neutrophils Absolute 03/10/2016 54    • Lymphocytes Absolute 03/10/2016 38    • MONOCYTES 03/10/2016 7    • Eosinophils Absolute 03/10/2016 1    • Basophils Absolute 03/10/2016 0    • Neutrophils Absolute 03/10/2016 2.4    • Lymphocytes Absolute 03/10/2016 1.7    • Monocytes Absolute 03/10/2016 0.3    • Eosinophils Absolute 03/10/2016 0.1    • Basophils Absolute 03/10/2016 0.0    • IMM. GRANULOCYTES (CD4/8) 03/10/2016 0    • IMM. GRANULOCYTES (CD4/8) 03/10/2016 0.0          Radiology Results:   No results found.

## 2023-07-13 NOTE — H&P (VIEW-ONLY)
[facility] Special[facility] - Outpatient Follow-up Note  Raghavendra Browning 79 y.o. female MRN: [id number]  Encounter: [id number]          ASSESSMENT AND PLAN:      1. Celiac disease  Patient with diagnosis of celiac disease in 2016 after admission for significant pancytopenia. No follow-up since 2021. She reports following a gluten-free diet currently. Reports bowel movements are mainly regular.    -We will pursue EGD for celiac as well as gastric intestinal metaplasia as noted below.  -Recommend obtaining basic lab work including CBC, CMP as well celiac panel. We will check iron panel and vitamin B12 as well. - Consider referral to hematology if any significant abnormalities.  -Discussed the importance of continuing gluten-free diet. Consider referral to nutrition in the future if celiac appears uncontrolled as she reports adherence to diet. 2. Gastric intestinal metaplasia  Noted on EGD in 2016. Last office visit in 2021 however did not follow-up with EGD at that time. Reports sister with gastric cancer recently diagnosed.    -Schedule EGD.  -Discussed the risk of seizure including bleeding, infection and perforation.    - Restart omeprazole 40 mg daily. Discussed the importance of continuing this in the setting of metaplasia. - Continue to avoid smoking, alcohol and NSAIDs. 3.  Epigastric pain  Reports new symptoms after taking new blood pressure pill over the last few months. If she eats before taking this she has no symptoms. Abdominal exam is benign.    -Continue to eat food with medications. - They will be following up with cardiology in a few weeks. - If symptoms are persistent and EGD is unremarkable, recommend abdominal imaging    4. Family history of colon cancer, sister  Reports her sister was recently diagnosed with colon cancer at the age of 76. She had colonoscopy in 2016 which was normal.  Unclear of any history of colon polyps.   Bowel movements relatively regular at this time. No recent lab work.    -Schedule colonoscopy at same time as EGD  -Discussed the risks of procedure including bleeding, infection and perforation.  - GoLytely prep ordered    -Patient will be obtaining basic lab work including CBC and CMP prior to procedures. 5.  Pancytopenia  As well as nation for this in 2016. Followed up with hematology thought to be secondary to infectious versus celiac disease. She had repeat lab work and all cell counts have since resolved however no recent lab work available to me since 2016.    -Repeat CBC and CMP now to ensure continue to be resolved. - Would recommend referral to hematology if any persistent abnormalities. Follow up after procedure  ______________________________________________________________________    SUBJECTIVE:      Britta Aaron is a 80-year-old female with history of celiac disease, GERD who presents the office for follow-up. Presents with her daughter who helps with translation. Patient was last in the office in 2021. At that time she was recommended for EGD and colonoscopy as well as lab work however did not have this performed. Patient is doing relatively well. However she reports being nervous because her sister was recently diagnosed with gastric cancer at the age of 79 as well as her other sister being diagnosed with colon cancer at the age of 76 prompting her to come in today. She reports relatively doing well. She is currently following a gluten-free diet. She does report if she does eat gluten she has diarrhea. She also reports some epigastric pain however this is only when taking her blood pressure pill which is new over the past few months. If she eats before taking this she has been doing well. She reports recently stopping her omeprazole. Denies any smoking, alcohol or NSAID. Last lab work available in the chart to me was from 2016 showing normal white count, hemoglobin and platelets.   However does appear she had significant pancytopenia in the past with platelets as low as 16 around that time. She had EGD and colonoscopy 1/2016. She was found to have atrophic gastritis, hemorrhoids, normal duodenum. Biopsies at that time were then notable for celiac disease. Gastric biopsies negative for H. pylori however was found to have intestinal metaplasia. REVIEW OF SYSTEMS IS OTHERWISE NEGATIVE. Historical Information   Past Medical History:   Diagnosis Date   • Anemia    • Celiac disease    • Diabetes mellitus ([address])     pre diabetes   • Diarrhea    • Hypertension      Past Surgical History:   Procedure Laterality Date   • EGD AND COLONOSCOPY Left 1/13/2016    Procedure: EGD with the biopsy and COLONOSCOPYwith biopsies;  Surgeon: Marilee Benjamin MD;  Location: [address] [place] [address] GI LAB; Service:    • HYSTERECTOMY      oopherectomy     Social History   Social History     Substance and Sexual Activity   Alcohol Use No     Social History     Substance and Sexual Activity   Drug Use No     Social History     Tobacco Use   Smoking Status Never   Smokeless Tobacco Never     Family History   Problem Relation Age of Onset   • Hypertension Mother    • Hyperlipidemia Mother    • Lung cancer Father    • Breast cancer Sister 47   • Colon cancer Sister    • Stomach cancer Sister    • No Known Problems Sister    • No Known Problems Sister    • No Known Problems Sister    • Breast cancer Maternal Aunt        Meds/Allergies       Current Outpatient Medications:   •  cyanocobalamin (VITAMIN B-12) 100 mcg tablet  •  losartan-hydrochlorothiazide (HYZAAR) 100-12.5 MG per tablet  •  Lumigan 0.01 % ophthalmic drops  •  omeprazole (PriLOSEC) 40 MG capsule  •  polyethylene glycol (GOLYTELY) 4000 mL solution  •  ammonium lactate (LAC-HYDRIN) 12 % lotion  •  urea (Urea 10 Hydrating) 10 % cream    No Known Allergies        Objective     Blood pressure 136/92, pulse 90, height 4' 11" (1.499 m), weight 48.5 kg (107 lb), SpO2 98 %.  Body mass index is 21.61 kg/m².      PHYSICAL EXAM:      General Appearance:   Alert, cooperative, no distress   HEENT:   Normocephalic, atraumatic, anicteric.     Neck:  Supple, symmetrical, trachea midline   Lungs:   Clear to auscultation bilaterally; no rales, rhonchi or wheezing; respirations unlabored    Heart[de-identified]   Regular rate and rhythm; no murmur, rub, or gallop. Abdomen:   Soft, non-tender, non-distended; normal bowel sounds; no masses, no organomegaly    Genitalia:   Deferred    Rectal:   Deferred    Extremities:  No cyanosis, clubbing or edema    Pulses:  2+ and symmetric    Skin:  No jaundice, rashes, or lesions    Lymph nodes:  No palpable cervical lymphadenopathy        Lab Results:   No visits with results within 1 Day(s) from this visit. Latest known visit with results is:   Office Visit on 02/10/2016   Component Date Value   • WBC 03/10/2016 4.5    • RBC 03/10/2016 3.82    • Hemoglobin 03/10/2016 11.6    • Hematocrit 03/10/2016 36.9    • MCV 03/10/2016 97    • MCH 03/10/2016 30.4    • MCHC 03/10/2016 31.4 (L)    • RDW 03/10/2016 14.2    • Platelets 08/68/1517 324    • Neutrophils Absolute 03/10/2016 54    • Lymphocytes Absolute 03/10/2016 38    • MONOCYTES 03/10/2016 7    • Eosinophils Absolute 03/10/2016 1    • Basophils Absolute 03/10/2016 0    • Neutrophils Absolute 03/10/2016 2.4    • Lymphocytes Absolute 03/10/2016 1.7    • Monocytes Absolute 03/10/2016 0.3    • Eosinophils Absolute 03/10/2016 0.1    • Basophils Absolute 03/10/2016 0.0    • IMM. GRANULOCYTES (CD4/8) 03/10/2016 0    • IMM. GRANULOCYTES (CD4/8) 03/10/2016 0.0          Radiology Results:   No results found.

## 2023-07-14 ENCOUNTER — APPOINTMENT (OUTPATIENT)
Dept: LAB | Facility: HOSPITAL | Age: 68
End: 2023-07-14
Payer: MEDICARE

## 2023-07-14 DIAGNOSIS — D69.6 THROMBOCYTOPENIA (HCC): ICD-10-CM

## 2023-07-14 DIAGNOSIS — K90.0 CELIAC DISEASE: ICD-10-CM

## 2023-07-14 LAB
ALBUMIN SERPL BCP-MCNC: 4.4 G/DL (ref 3.5–5)
ALP SERPL-CCNC: 63 U/L (ref 34–104)
ALT SERPL W P-5'-P-CCNC: 22 U/L (ref 7–52)
ANION GAP SERPL CALCULATED.3IONS-SCNC: 7 MMOL/L
AST SERPL W P-5'-P-CCNC: 21 U/L (ref 13–39)
BILIRUB SERPL-MCNC: 0.67 MG/DL (ref 0.2–1)
BUN SERPL-MCNC: 10 MG/DL (ref 5–25)
CALCIUM SERPL-MCNC: 9.5 MG/DL (ref 8.4–10.2)
CHLORIDE SERPL-SCNC: 98 MMOL/L (ref 96–108)
CO2 SERPL-SCNC: 28 MMOL/L (ref 21–32)
CREAT SERPL-MCNC: 0.59 MG/DL (ref 0.6–1.3)
ERYTHROCYTE [DISTWIDTH] IN BLOOD BY AUTOMATED COUNT: 12.9 % (ref 11.6–15.1)
FERRITIN SERPL-MCNC: 20 NG/ML (ref 11–307)
GFR SERPL CREATININE-BSD FRML MDRD: 95 ML/MIN/1.73SQ M
GLUCOSE SERPL-MCNC: 98 MG/DL (ref 65–140)
HCT VFR BLD AUTO: 39.6 % (ref 34.8–46.1)
HGB BLD-MCNC: 13.1 G/DL (ref 11.5–15.4)
IRON SATN MFR SERPL: 23 % (ref 15–50)
IRON SERPL-MCNC: 87 UG/DL (ref 50–170)
MCH RBC QN AUTO: 32.3 PG (ref 26.8–34.3)
MCHC RBC AUTO-ENTMCNC: 33.1 G/DL (ref 31.4–37.4)
MCV RBC AUTO: 98 FL (ref 82–98)
PLATELET # BLD AUTO: 301 THOUSANDS/UL (ref 149–390)
PMV BLD AUTO: 8.1 FL (ref 8.9–12.7)
POTASSIUM SERPL-SCNC: 4.4 MMOL/L (ref 3.5–5.3)
PROT SERPL-MCNC: 6.4 G/DL (ref 6.4–8.4)
RBC # BLD AUTO: 4.06 MILLION/UL (ref 3.81–5.12)
SODIUM SERPL-SCNC: 133 MMOL/L (ref 135–147)
TIBC SERPL-MCNC: 379 UG/DL (ref 250–450)
VIT B12 SERPL-MCNC: 144 PG/ML (ref 180–914)
WBC # BLD AUTO: 5.03 THOUSAND/UL (ref 4.31–10.16)

## 2023-07-14 PROCEDURE — 85027 COMPLETE CBC AUTOMATED: CPT

## 2023-07-14 PROCEDURE — 83550 IRON BINDING TEST: CPT

## 2023-07-14 PROCEDURE — 82728 ASSAY OF FERRITIN: CPT

## 2023-07-14 PROCEDURE — 86364 TISS TRNSGLTMNASE EA IG CLAS: CPT

## 2023-07-14 PROCEDURE — 83540 ASSAY OF IRON: CPT

## 2023-07-14 PROCEDURE — 36415 COLL VENOUS BLD VENIPUNCTURE: CPT

## 2023-07-14 PROCEDURE — 86231 EMA EACH IG CLASS: CPT

## 2023-07-14 PROCEDURE — 86258 DGP ANTIBODY EACH IG CLASS: CPT

## 2023-07-14 PROCEDURE — 82784 ASSAY IGA/IGD/IGG/IGM EACH: CPT

## 2023-07-14 PROCEDURE — 80053 COMPREHEN METABOLIC PANEL: CPT

## 2023-07-14 PROCEDURE — 82607 VITAMIN B-12: CPT

## 2023-07-16 LAB
ENDOMYSIUM IGA SER QL: NEGATIVE
GLIADIN PEPTIDE IGA SER-ACNC: 2 UNITS (ref 0–19)
GLIADIN PEPTIDE IGG SER-ACNC: 1 UNITS (ref 0–19)
IGA SERPL-MCNC: 174 MG/DL (ref 87–352)
TTG IGA SER-ACNC: <2 U/ML (ref 0–3)
TTG IGG SER-ACNC: <2 U/ML (ref 0–5)

## 2023-07-18 ENCOUNTER — TELEPHONE (OUTPATIENT)
Dept: GASTROENTEROLOGY | Facility: CLINIC | Age: 68
End: 2023-07-18

## 2023-07-18 NOTE — TELEPHONE ENCOUNTER
Spoke with patient confirming her 8/1 colon/egd with Dr. Granado January. She has a , will be called day prior with arrival time and she has her prep/instructions.

## 2023-07-21 ENCOUNTER — OFFICE VISIT (OUTPATIENT)
Dept: CARDIOLOGY CLINIC | Facility: CLINIC | Age: 68
End: 2023-07-21
Payer: MEDICARE

## 2023-07-21 VITALS
HEIGHT: 59 IN | BODY MASS INDEX: 21.77 KG/M2 | WEIGHT: 108 LBS | OXYGEN SATURATION: 99 % | SYSTOLIC BLOOD PRESSURE: 114 MMHG | HEART RATE: 80 BPM | DIASTOLIC BLOOD PRESSURE: 72 MMHG

## 2023-07-21 DIAGNOSIS — I10 PAROXYSMAL HYPERTENSION: Primary | ICD-10-CM

## 2023-07-21 PROCEDURE — 99214 OFFICE O/P EST MOD 30 MIN: CPT | Performed by: INTERNAL MEDICINE

## 2023-07-21 PROCEDURE — 93000 ELECTROCARDIOGRAM COMPLETE: CPT | Performed by: INTERNAL MEDICINE

## 2023-07-21 NOTE — PROGRESS NOTES
Progress Note - Cardiology Office  [facility]    Rose Ponce 79 y.o. 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 Patient gave no reason  Continue losartan hydrochlorothiazide  Low-salt diet  Cardiovascular exercise as tolerated    Please call [phone] if any questions. HPI :     Rose Ponce is a 79y.o. year old female who came for follow up. She generally feels well and denies any cardiac symptoms. I asked the patient and her daughter regarding the stress test and the echocardiogram that were previously ordered but have not been done. They acknowledged but did not give any reason. She complains of some abdominal discomfort for which I advised her to follow-up with her PCP    REVIEW OF SYSTEMS:  Denies any new or acute cardiac symptoms. Denies chest pain, dyspnea, palpitations or syncope    Historical Information   Past Medical History:   Diagnosis Date   • Anemia    • Celiac disease    • Diabetes mellitus ([address])     pre diabetes   • Diarrhea    • Hypertension      Past Surgical History:   Procedure Laterality Date   • EGD AND COLONOSCOPY Left 2016    Procedure: EGD with the biopsy and COLONOSCOPYwith biopsies;  Surgeon: Jose Myers MD;  Location: [address] [place] [address] GI LAB;   Service:    • HYSTERECTOMY      oopherectomy     Social History     Substance and Sexual Activity   Alcohol Use No     Social History     Substance and Sexual Activity   Drug Use No     Social History     Tobacco Use   Smoking Status Never   Smokeless Tobacco Never     Family History:   Family History   Problem Relation Age of Onset   • Hypertension Mother    • Hyperlipidemia Mother    • Lung cancer Father    • Breast cancer Sister 47   • Colon cancer Sister    • Stomach cancer Sister    • No Known Problems Sister    • No Known Problems Sister    • No Known Problems Sister    • Breast cancer Maternal Aunt        Meds/Allergies     No Known Allergies    Current Outpatient Medications:   •  cyanocobalamin (VITAMIN B-12) 100 mcg tablet, Take by mouth, Disp: , Rfl:   •  losartan-hydrochlorothiazide (HYZAAR) 100-12.5 MG per tablet, Take 1 tablet by mouth daily, Disp: , Rfl:   •  Lumigan 0.01 % ophthalmic drops, INSTILL 1 DROP INTO EACH EYE AT NIGHT AS DIRECTED, Disp: , Rfl:   •  omeprazole (PriLOSEC) 40 MG capsule, Take 1 capsule (40 mg total) by mouth daily before breakfast, Disp: 30 capsule, Rfl: 6  •  ammonium lactate (LAC-HYDRIN) 12 % lotion, Apply topically 2 (two) times a day as needed for dry skin (Patient not taking: Reported on 7/13/2023), Disp: 400 g, Rfl: 0  •  polyethylene glycol (GOLYTELY) 4000 mL solution, Take 4,000 mL by mouth once for 1 dose, Disp: 4000 mL, Rfl: 0  •  urea (Urea 10 Hydrating) 10 % cream, Apply topically 2 (two) times a day Apply twice a day to arms after shower while skin is damp (Patient not taking: Reported on 7/13/2023), Disp: 453 g, Rfl: 1    Vitals: Blood pressure 114/72, pulse 80, height 4' 11" (1.499 m), weight 49 kg (108 lb), SpO2 99 %. ?  Body mass index is 21.81 kg/m².   Vitals:    07/21/23 1352   Weight: 49 kg (108 lb)     BP Readings from Last 3 Encounters:   07/21/23 114/72   07/13/23 136/92   11/11/21 130/86       Physical Exam:  Physical Exam    Neurologic:  Alert & oriented x 3, no new focal deficits, Not in any acute distress,  Constitutional:  Well developed, well nourished, non-toxic appearance   Eyes:  Pupil equal and reacting to light, conjunctiva normal,   HENT:  Atraumatic, oropharynx moist, Neck- normal range of motion, no tenderness,  Neck supple, No JVP, No LNP   Respiratory:  Bilateral air entry, mostly clear to auscultation  Cardiovascular: S1-S2 regular rhythm  GI:  Soft, nondistended, normal bowel sounds, nontender, no hepatosplenomegaly appreciated. Musculoskeletal: no tenderness, no deformities. Skin:  Well hydrated, no rash   Lymphatic:  No lymphadenopathy noted   Extremities:  No edema and distal pulses are present        Diagnostic Studies Review Cardio:      EKG: Normal sinus rhythm, heart rate 80/min, nonspecific T wave abnormality    Cardiac testing:   No results found for this or any previous visit. Imaging:  Chest X-Ray:   No Chest XR results available for this patient. CT-scan of the chest:     No CTA results available for this patient.   Lab Review   Lab Results   Component Value Date    WBC 5.03 07/14/2023    HGB 13.1 07/14/2023    HCT 39.6 07/14/2023    MCV 98 07/14/2023    RDW 12.9 07/14/2023     07/14/2023     BMP:  Lab Results   Component Value Date    SODIUM 133 (L) 07/14/2023    K 4.4 07/14/2023    CL 98 07/14/2023    CO2 28 07/14/2023    ANIONGAP 9.7 (L) 01/08/2016    BUN 10 07/14/2023    CREATININE 0.59 (L) 07/14/2023    GLUC 98 07/14/2023    CALCIUM 9.5 07/14/2023    EGFR 95 07/14/2023     LFT:  Lab Results   Component Value Date    AST 21 07/14/2023    ALT 22 07/14/2023    ALKPHOS 63 07/14/2023    TP 6.4 07/14/2023    ALB 4.4 07/14/2023      No components found for: "TSH3"  No results found for: "[id number]"  No results found for: "HGBA1C"  Lipid Profile:   No results found for: "CHOLESTEROL", "HDL", "[address]", "TRIG"  No results found for: "CHOLESTEROL"  No results found for: "CKTOTAL", "CKMB", "CKMBINDEX", "TROPONINI"  No results found for: "NTBNP"   Recent Results (from the past 672 hour(s))   Celiac Disease Antibody Profile    Collection Time: 07/14/23  8:11 AM   Result Value Ref Range    IgA 174 87 - 352 mg/dL    Gliadin IgA 2 0 - 19 units    Gliadin IgG 1 0 - 19 units    Tissue Transglut Ab IGG <2 0 - 5 U/mL TISSUE TRANSGLUTAMINASE IGA <2 0 - 3 U/mL    Endomysial IgA Negative Negative   CBC and Platelet    Collection Time: 07/14/23  8:11 AM   Result Value Ref Range    WBC 5.03 4.31 - 10.16 Thousand/uL    RBC 4.06 3.81 - 5.12 Million/uL    Hemoglobin 13.1 11.5 - 15.4 g/dL    Hematocrit 39.6 34.8 - 46.1 %    MCV 98 82 - 98 fL    MCH 32.3 26.8 - 34.3 pg    MCHC 33.1 31.4 - 37.4 g/dL    RDW 12.9 11.6 - 15.1 %    Platelets 458 330 - 981 Thousands/uL    MPV 8.1 (L) 8.9 - 12.7 fL   Comprehensive metabolic panel    Collection Time: 07/14/23  8:11 AM   Result Value Ref Range    Sodium 133 (L) 135 - 147 mmol/L    Potassium 4.4 3.5 - 5.3 mmol/L    Chloride 98 96 - 108 mmol/L    CO2 28 21 - 32 mmol/L    ANION GAP 7 mmol/L    BUN 10 5 - 25 mg/dL    Creatinine 0.59 (L) 0.60 - 1.30 mg/dL    Glucose 98 65 - 140 mg/dL    Calcium 9.5 8.4 - 10.2 mg/dL    AST 21 13 - 39 U/L    ALT 22 7 - 52 U/L    Alkaline Phosphatase 63 34 - 104 U/L    Total Protein 6.4 6.4 - 8.4 g/dL    Albumin 4.4 3.5 - 5.0 g/dL    Total Bilirubin 0.67 0.20 - 1.00 mg/dL    eGFR 95 ml/min/1.73sq m   Vitamin B12    Collection Time: 07/14/23  8:11 AM   Result Value Ref Range    Vitamin B-12 144 (L) 180 - 914 pg/mL   Iron Saturation %    Collection Time: 07/14/23  8:11 AM   Result Value Ref Range    Iron Saturation 23 15 - 50 %    TIBC 379 250 - 450 ug/dL    Iron 87 50 - 170 ug/dL   Ferritin    Collection Time: 07/14/23  8:11 AM   Result Value Ref Range    Ferritin 20 11 - 307 ng/mL             Dr. Milla Zamorano MD, [facility]      "This note has been constructed using a voice recognition system. Therefore there may be syntax, spelling, and/or grammatical errors.  Please call if you have any questions. "

## 2023-08-01 ENCOUNTER — ANESTHESIA EVENT (OUTPATIENT)
Dept: GASTROENTEROLOGY | Facility: AMBULARY SURGERY CENTER | Age: 68
End: 2023-08-01

## 2023-08-01 ENCOUNTER — ANESTHESIA (OUTPATIENT)
Dept: GASTROENTEROLOGY | Facility: AMBULARY SURGERY CENTER | Age: 68
End: 2023-08-01

## 2023-08-01 ENCOUNTER — HOSPITAL ENCOUNTER (OUTPATIENT)
Dept: GASTROENTEROLOGY | Facility: AMBULARY SURGERY CENTER | Age: 68
Setting detail: OUTPATIENT SURGERY
Discharge: HOME | End: 2023-08-01
Attending: INTERNAL MEDICINE
Payer: MEDICARE

## 2023-08-01 VITALS
RESPIRATION RATE: 18 BRPM | TEMPERATURE: 97.4 F | HEART RATE: 85 BPM | DIASTOLIC BLOOD PRESSURE: 87 MMHG | OXYGEN SATURATION: 100 % | SYSTOLIC BLOOD PRESSURE: 169 MMHG

## 2023-08-01 DIAGNOSIS — Z80.0 FAMILY HISTORY OF COLON CANCER: ICD-10-CM

## 2023-08-01 DIAGNOSIS — K31.89 DUODENAL MASS: Primary | ICD-10-CM

## 2023-08-01 DIAGNOSIS — K31.A0 INTESTINAL METAPLASIA OF STOMACH: ICD-10-CM

## 2023-08-01 DIAGNOSIS — K90.0 CELIAC DISEASE: ICD-10-CM

## 2023-08-01 PROBLEM — Z90.710 HISTORY OF HYSTERECTOMY: Status: ACTIVE | Noted: 2023-08-01

## 2023-08-01 PROCEDURE — 88305 TISSUE EXAM BY PATHOLOGIST: CPT | Performed by: PATHOLOGY

## 2023-08-01 PROCEDURE — 88341 IMHCHEM/IMCYTCHM EA ADD ANTB: CPT | Performed by: PATHOLOGY

## 2023-08-01 PROCEDURE — 88342 IMHCHEM/IMCYTCHM 1ST ANTB: CPT | Performed by: PATHOLOGY

## 2023-08-01 PROCEDURE — 88360 TUMOR IMMUNOHISTOCHEM/MANUAL: CPT | Performed by: PATHOLOGY

## 2023-08-01 PROCEDURE — 88313 SPECIAL STAINS GROUP 2: CPT | Performed by: PATHOLOGY

## 2023-08-01 RX ORDER — SODIUM CHLORIDE, SODIUM LACTATE, POTASSIUM CHLORIDE, CALCIUM CHLORIDE 600; 310; 30; 20 MG/100ML; MG/100ML; MG/100ML; MG/100ML
75 INJECTION, SOLUTION INTRAVENOUS CONTINUOUS
Status: DISCONTINUED | OUTPATIENT
Start: 2023-08-01 | End: 2023-08-05 | Stop reason: HOSPADM

## 2023-08-01 RX ORDER — PROPOFOL 10 MG/ML
INJECTION, EMULSION INTRAVENOUS AS NEEDED
Status: DISCONTINUED | OUTPATIENT
Start: 2023-08-01 | End: 2023-08-01

## 2023-08-01 RX ADMIN — SODIUM CHLORIDE, SODIUM LACTATE, POTASSIUM CHLORIDE, AND CALCIUM CHLORIDE 75 ML/HR: .6; .31; .03; .02 INJECTION, SOLUTION INTRAVENOUS at 08:04

## 2023-08-01 RX ADMIN — PROPOFOL 50 MG: 10 INJECTION, EMULSION INTRAVENOUS at 08:28

## 2023-08-01 RX ADMIN — PROPOFOL 100 MG: 10 INJECTION, EMULSION INTRAVENOUS at 08:27

## 2023-08-01 NOTE — ANESTHESIA PREPROCEDURE EVALUATION
Procedure:  COLONOSCOPY  EGD    Relevant Problems   GI/HEPATIC   (+) Gastroesophageal reflux disease without esophagitis      GYN   (+) History of hysterectomy      HEMATOLOGY   (+) B12 deficiency anemia   (+) Thrombocytopenia (HCC)      Digestive   (+) Celiac disease        Physical Exam    Airway    Mallampati score: II  TM Distance: >3 FB  Neck ROM: full     Dental   upper dentures and lower dentures,     Cardiovascular  Rhythm: regular, Rate: normal,     Pulmonary  Breath sounds clear to auscultation,     Other Findings  Full upper and partial lower denture      Anesthesia Plan  ASA Score- 2     Anesthesia Type- IV sedation with anesthesia with ASA Monitors. Additional Monitors:   Airway Plan:           Plan Factors-    Chart reviewed. Patient is not a current smoker. Induction- intravenous. Postoperative Plan-     Informed Consent- Anesthetic plan and risks discussed with patient. I personally reviewed this patient with the CRNA. Discussed and agreed on the Anesthesia Plan with the CRNA. Satnam Babcock

## 2023-08-01 NOTE — ANESTHESIA POSTPROCEDURE EVALUATION
Post-Op Assessment Note    CV Status:  Stable    Pain management: adequate     Mental Status:  Alert and awake   Hydration Status:  Euvolemic   PONV Controlled:  Controlled   Airway Patency:  Patent      Post Op Vitals Reviewed: Yes            No notable events documented.     BP   142/91   Temp     Pulse  89   Resp   20   SpO2   99

## 2023-08-01 NOTE — INTERVAL H&P NOTE
H&P reviewed. After examining the patient I find no changes in the patients condition since the H&P had been written.     Vitals:    08/01/23 0757   BP: 161/84   Pulse: 89   Resp: 18   Temp: (!) 97.4 °F (36.3 °C)   SpO2: 100%

## 2023-08-07 PROCEDURE — 88342 IMHCHEM/IMCYTCHM 1ST ANTB: CPT | Performed by: PATHOLOGY

## 2023-08-07 PROCEDURE — 88313 SPECIAL STAINS GROUP 2: CPT | Performed by: PATHOLOGY

## 2023-08-07 PROCEDURE — 88305 TISSUE EXAM BY PATHOLOGIST: CPT | Performed by: PATHOLOGY

## 2023-08-07 PROCEDURE — 88341 IMHCHEM/IMCYTCHM EA ADD ANTB: CPT | Performed by: PATHOLOGY

## 2023-08-07 PROCEDURE — 88360 TUMOR IMMUNOHISTOCHEM/MANUAL: CPT | Performed by: PATHOLOGY

## 2024-12-19 NOTE — PROGRESS NOTES
**Required when the History and Physical has been performed prior to Registration**  (within a 30 day period, if it's over 30 days then a new and complete History and Physical needs to be completed)  **An update to the history and physical must be completed prior to the start of the surgery or procedure requiring anesthesia services.**    The History and Physical has been reviewed and I have examined the patient.  Based upon my physical assessment and interview of the patient:    Check and/or complete:    [X] No significant changes have occurred in the patient's condition since the History and Physical was completed.      OR    [_] Changes to History and Physical (see below):          Nikia Mendosa PA-C   12/19/2024    Consultation - Cardiology Office  [facility]  Daniel Rea 72 y o  female MRN: [id number]  : 1955  Unit/Bed#:  Encounter: [id number]      ASSESSMENT AND RECOMMENDATIONS:  1  Paroxysmal Hypertension:  BP today is 130/86, yesterday it was 154/74 mm Hg  > advised patient on low-salt diet and regular cardiovascular exercise  > echocardiogram to evaluate for hypertensive heart disease    2  History of anemia/pancytopenia, celiac disease  Managed by other physicians    3  Abnormal EKG:  EKG shows questionable old inferior MI  > stress test to rule out Coronary artery disease        Thank you for your consultation  If you have any question please call me at [place] [phone]      Primary Care Physician Requesting Consult: [address] [facility]  Cleo Griffin MD      Reason for Consult / Principal Problem:  Paroxysmal hypertension        HPI :     Daniel Rea is a 72y o  year old female who was referred by primary care doctor for evaluation of paroxysmal hypertension  Yesterday it was 154/74 and today it is 130/86 with a heart rate of 80  Patient denies any chest pain, unusual dyspnea, palpitations or syncope  She has a history of anemia and pancytopenia and celiac disease  REVIEW OF SYSTEMS:      Constitutional: Negative for activity change, appetite change, chills, fatigue, fever and unexpected weight change  HENT: Negative for congestion, sore throat and trouble swallowing  Eyes: Negative for discharge and redness  Respiratory: Negative for apnea, cough, chest tightness, shortness of breath and wheezing  Cardiovascular: Negative for chest pain, shortness of breath, palpitations and leg swelling  Gastrointestinal: Negative for abdominal distention, abdominal pain, anal bleeding, blood in stool, constipation, positive for intermittent diarrhea, negative for nausea and vomiting  Endocrine: Negative for polydipsia, polyphagia and polyuria     Genitourinary: Negative for difficulty urinating, dysuria, flank pain and hematuria  Musculoskeletal: Negative for arthralgias, myalgias and neck stiffness  Skin: Negative for pallor and rash  Allergic/Immunologic: Negative for environmental allergies  Neurological: Negative for dizziness, syncope, light-headedness, numbness and headaches  Hematological: Negative for adenopathy  Does not bruise/bleed easily  Psychiatric/Behavioral: Negative for confusion and hallucinations  The patient is not nervous/anxious  Historical Information   Past Medical History:   Diagnosis Date    Anemia     Celiac disease     Diarrhea      Past Surgical History:   Procedure Laterality Date    EGD AND COLONOSCOPY Left 1/13/2016    Procedure: EGD with the biopsy and COLONOSCOPYwith biopsies;  Surgeon: Kole Patricio MD;  Location: [facility];   Service:     HYSTERECTOMY      oopherectomy     Social History     Substance and Sexual Activity   Alcohol Use No     Social History     Substance and Sexual Activity   Drug Use No     Social History     Tobacco Use   Smoking Status Never Smoker   Smokeless Tobacco Never Used     Family History:   Family History   Problem Relation Age of Onset    Hypertension Mother     Hyperlipidemia Mother     Lung cancer Father        Meds/Allergies     No Known Allergies    Current Outpatient Medications:     cyanocobalamin (VITAMIN B-12) 100 mcg tablet, Take by mouth, Disp: , Rfl:     omeprazole (PriLOSEC) 40 MG capsule, Take 40 mg by mouth daily, Disp: , Rfl:     Vitals:   /86 mm Hg  HR 80/Min  BP Readings from Last 3 Encounters:   01/13/21 154/74   02/10/16 130/80   01/13/16 (!) 121/74         PHYSICAL EXAMINATION:  Neurologic:  Alert & oriented x 3, no new focal deficits, Not in any acute distress,  Constitutional:  Well developed, well nourished, non-toxic appearance   Eyes:  Pupil equal and reacting to light, conjunctiva normal, No JVP, No LNP   HENT:  Atraumatic, oropharynx moist, Neck- normal range of motion, no tenderness,  Neck supple   Respiratory:  Bilateral air entry, mostly clear to auscultation  Cardiovascular: S1-S2 regular with a I/VI systolic murmur   GI:  Soft, nondistended, normal bowel sounds, nontender, no hepatosplenomegaly appreciated  Musculoskeletal:  No edema, no tenderness, no deformities  Skin:  Well hydrated, no rash   Lymphatic:  No lymphadenopathy noted   Extremities:  No edema and distal pulses are present    Diagnostic Studies Review Cardio:      EKG:  Normal sinus rhythm, heart rate 80 per minute, inferior MI of indeterminate age cannot be excluded    Cardiac testing:   No results found for this or any previous visit  Imaging:  Chest X-Ray:   Xr Chest Pa & Lateral    Result Date: 1/8/2021  Impression No acute cardiopulmonary disease  Workstation performed: [id number]       CT-scan of the chest:     No CTA results available for this patient  Lab Review   Lab Results   Component Value Date    WBC 4 5 03/10/2016    HGB 11 6 03/10/2016    HCT 36 9 03/10/2016    MCV 97 03/10/2016    RDW 14 2 03/10/2016     03/10/2016     BMP:  Lab Results   Component Value Date    SODIUM 143 01/13/2016    K 3 7 01/13/2016     01/13/2016    CO2 28 01/13/2016    ANIONGAP 9 7 (L) 01/08/2016    BUN 5 01/13/2016    CREATININE 0 47 (L) 01/13/2016    GLUC 79 01/13/2016    CALCIUM 8 6 01/13/2016    EGFR >60 0 01/13/2016     LFT:  Lab Results   Component Value Date    AST 66 (H) 01/08/2016    ALT 42 01/08/2016    ALKPHOS 69 01/08/2016    TP 5 2 (L) 01/09/2016    ALB 3 4 01/08/2016      No results found for: EIV2YENBFYMG  No components found for: TSH3  No results found for: HGBA1C  Lipid Profile:   No results found for: CHOLESTEROL, HDL, LDLCALC, TRIG        Dr Agustina Hebert MD, [facility]      "This note has been constructed using a voice recognition system  Therefore there may be syntax, spelling, and/or grammatical errors   Please call if you have any questions  "